# Patient Record
Sex: FEMALE | Race: WHITE | NOT HISPANIC OR LATINO | Employment: FULL TIME | URBAN - METROPOLITAN AREA
[De-identification: names, ages, dates, MRNs, and addresses within clinical notes are randomized per-mention and may not be internally consistent; named-entity substitution may affect disease eponyms.]

---

## 2024-09-27 ENCOUNTER — HOSPITAL ENCOUNTER (INPATIENT)
Facility: HOSPITAL | Age: 44
LOS: 3 days | Discharge: HOME | End: 2024-09-30
Attending: EMERGENCY MEDICINE | Admitting: FAMILY MEDICINE
Payer: COMMERCIAL

## 2024-09-27 ENCOUNTER — APPOINTMENT (OUTPATIENT)
Dept: GENERAL RADIOLOGY | Facility: HOSPITAL | Age: 44
End: 2024-09-27
Payer: COMMERCIAL

## 2024-09-27 DIAGNOSIS — D50.9 IRON DEFICIENCY ANEMIA, UNSPECIFIED IRON DEFICIENCY ANEMIA TYPE: ICD-10-CM

## 2024-09-27 DIAGNOSIS — L02.414 ABSCESS OF FOREARM, LEFT: Primary | ICD-10-CM

## 2024-09-27 PROBLEM — L03.90 CELLULITIS: Status: ACTIVE | Noted: 2024-09-27

## 2024-09-27 LAB
ALBUMIN SERPL-MCNC: 3.5 G/DL (ref 3.5–5.2)
ALBUMIN/GLOB SERPL: 0.9 G/DL
ALP SERPL-CCNC: 88 U/L (ref 39–117)
ALT SERPL W P-5'-P-CCNC: 20 U/L (ref 1–33)
ANION GAP SERPL CALCULATED.3IONS-SCNC: 9.8 MMOL/L (ref 5–15)
ANISOCYTOSIS BLD QL: NORMAL
AST SERPL-CCNC: 17 U/L (ref 1–32)
BASOPHILS # BLD AUTO: 0.01 10*3/MM3 (ref 0–0.2)
BASOPHILS NFR BLD AUTO: 0.1 % (ref 0–1.5)
BILIRUB SERPL-MCNC: 0.7 MG/DL (ref 0–1.2)
BUN SERPL-MCNC: 6 MG/DL (ref 6–20)
BUN/CREAT SERPL: 10 (ref 7–25)
CALCIUM SPEC-SCNC: 9.4 MG/DL (ref 8.6–10.5)
CHLORIDE SERPL-SCNC: 98 MMOL/L (ref 98–107)
CO2 SERPL-SCNC: 28.2 MMOL/L (ref 22–29)
CREAT SERPL-MCNC: 0.6 MG/DL (ref 0.57–1)
D-LACTATE SERPL-SCNC: 1.4 MMOL/L (ref 0.5–2)
DEPRECATED RDW RBC AUTO: 42.7 FL (ref 37–54)
EGFRCR SERPLBLD CKD-EPI 2021: 114.4 ML/MIN/1.73
EOSINOPHIL # BLD AUTO: 0.25 10*3/MM3 (ref 0–0.4)
EOSINOPHIL NFR BLD AUTO: 2.5 % (ref 0.3–6.2)
ERYTHROCYTE [DISTWIDTH] IN BLOOD BY AUTOMATED COUNT: 15.8 % (ref 12.3–15.4)
GLOBULIN UR ELPH-MCNC: 3.8 GM/DL
GLUCOSE SERPL-MCNC: 123 MG/DL (ref 65–99)
HCT VFR BLD AUTO: 35.5 % (ref 34–46.6)
HGB BLD-MCNC: 10.9 G/DL (ref 12–15.9)
HYPOCHROMIA BLD QL: NORMAL
IMM GRANULOCYTES # BLD AUTO: 0.04 10*3/MM3 (ref 0–0.05)
IMM GRANULOCYTES NFR BLD AUTO: 0.4 % (ref 0–0.5)
LYMPHOCYTES # BLD AUTO: 1.81 10*3/MM3 (ref 0.7–3.1)
LYMPHOCYTES NFR BLD AUTO: 18.3 % (ref 19.6–45.3)
MACROCYTES BLD QL SMEAR: NORMAL
MCH RBC QN AUTO: 22.9 PG (ref 26.6–33)
MCHC RBC AUTO-ENTMCNC: 30.7 G/DL (ref 31.5–35.7)
MCV RBC AUTO: 74.7 FL (ref 79–97)
MICROCYTES BLD QL: NORMAL
MONOCYTES # BLD AUTO: 0.38 10*3/MM3 (ref 0.1–0.9)
MONOCYTES NFR BLD AUTO: 3.8 % (ref 5–12)
NEUTROPHILS NFR BLD AUTO: 7.41 10*3/MM3 (ref 1.7–7)
NEUTROPHILS NFR BLD AUTO: 74.9 % (ref 42.7–76)
NRBC BLD AUTO-RTO: 0 /100 WBC (ref 0–0.2)
PLATELET # BLD AUTO: 290 10*3/MM3 (ref 140–450)
PMV BLD AUTO: 9.7 FL (ref 6–12)
POTASSIUM SERPL-SCNC: 3.5 MMOL/L (ref 3.5–5.2)
PROT SERPL-MCNC: 7.3 G/DL (ref 6–8.5)
RBC # BLD AUTO: 4.75 10*6/MM3 (ref 3.77–5.28)
SMALL PLATELETS BLD QL SMEAR: ADEQUATE
SODIUM SERPL-SCNC: 136 MMOL/L (ref 136–145)
WBC MORPH BLD: NORMAL
WBC NRBC COR # BLD AUTO: 9.9 10*3/MM3 (ref 3.4–10.8)

## 2024-09-27 PROCEDURE — 36415 COLL VENOUS BLD VENIPUNCTURE: CPT

## 2024-09-27 PROCEDURE — 85025 COMPLETE CBC W/AUTO DIFF WBC: CPT | Performed by: EMERGENCY MEDICINE

## 2024-09-27 PROCEDURE — 96365 THER/PROPH/DIAG IV INF INIT: CPT

## 2024-09-27 PROCEDURE — 84466 ASSAY OF TRANSFERRIN: CPT | Performed by: FAMILY MEDICINE

## 2024-09-27 PROCEDURE — 25010000002 PIPERACILLIN SOD-TAZOBACTAM PER 1 G: Performed by: FAMILY MEDICINE

## 2024-09-27 PROCEDURE — 25010000002 VANCOMYCIN 1 G RECONSTITUTED SOLUTION: Performed by: FAMILY MEDICINE

## 2024-09-27 PROCEDURE — 25810000003 SODIUM CHLORIDE 0.9 % SOLUTION: Performed by: FAMILY MEDICINE

## 2024-09-27 PROCEDURE — 87040 BLOOD CULTURE FOR BACTERIA: CPT | Performed by: EMERGENCY MEDICINE

## 2024-09-27 PROCEDURE — 99285 EMERGENCY DEPT VISIT HI MDM: CPT

## 2024-09-27 PROCEDURE — 83540 ASSAY OF IRON: CPT | Performed by: FAMILY MEDICINE

## 2024-09-27 PROCEDURE — 99223 1ST HOSP IP/OBS HIGH 75: CPT | Performed by: FAMILY MEDICINE

## 2024-09-27 PROCEDURE — 96361 HYDRATE IV INFUSION ADD-ON: CPT

## 2024-09-27 PROCEDURE — 25010000002 VANCOMYCIN 5 G RECONSTITUTED SOLUTION: Performed by: EMERGENCY MEDICINE

## 2024-09-27 PROCEDURE — 73090 X-RAY EXAM OF FOREARM: CPT

## 2024-09-27 PROCEDURE — 80053 COMPREHEN METABOLIC PANEL: CPT | Performed by: EMERGENCY MEDICINE

## 2024-09-27 PROCEDURE — 0H9EXZZ DRAINAGE OF LEFT LOWER ARM SKIN, EXTERNAL APPROACH: ICD-10-PCS | Performed by: EMERGENCY MEDICINE

## 2024-09-27 PROCEDURE — 25810000003 SODIUM CHLORIDE 0.9 % SOLUTION: Performed by: EMERGENCY MEDICINE

## 2024-09-27 PROCEDURE — 85007 BL SMEAR W/DIFF WBC COUNT: CPT | Performed by: EMERGENCY MEDICINE

## 2024-09-27 PROCEDURE — 83605 ASSAY OF LACTIC ACID: CPT | Performed by: EMERGENCY MEDICINE

## 2024-09-27 PROCEDURE — 25010000002 PIPERACILLIN SOD-TAZOBACTAM PER 1 G: Performed by: EMERGENCY MEDICINE

## 2024-09-27 PROCEDURE — 25810000003 LACTATED RINGERS PER 1000 ML: Performed by: FAMILY MEDICINE

## 2024-09-27 RX ORDER — ENOXAPARIN SODIUM 100 MG/ML
40 INJECTION SUBCUTANEOUS DAILY
Status: DISCONTINUED | OUTPATIENT
Start: 2024-09-27 | End: 2024-09-30 | Stop reason: HOSPADM

## 2024-09-27 RX ORDER — VANCOMYCIN 2 GRAM/500 ML IN 0.9 % SODIUM CHLORIDE INTRAVENOUS
20 ONCE
Status: COMPLETED | OUTPATIENT
Start: 2024-09-27 | End: 2024-09-27

## 2024-09-27 RX ORDER — AMOXICILLIN 250 MG
2 CAPSULE ORAL 2 TIMES DAILY PRN
Status: DISCONTINUED | OUTPATIENT
Start: 2024-09-27 | End: 2024-09-30 | Stop reason: HOSPADM

## 2024-09-27 RX ORDER — SODIUM CHLORIDE 0.9 % (FLUSH) 0.9 %
10 SYRINGE (ML) INJECTION EVERY 12 HOURS SCHEDULED
Status: DISCONTINUED | OUTPATIENT
Start: 2024-09-27 | End: 2024-09-30 | Stop reason: HOSPADM

## 2024-09-27 RX ORDER — BISACODYL 5 MG/1
5 TABLET, DELAYED RELEASE ORAL DAILY PRN
Status: DISCONTINUED | OUTPATIENT
Start: 2024-09-27 | End: 2024-09-30 | Stop reason: HOSPADM

## 2024-09-27 RX ORDER — POLYETHYLENE GLYCOL 3350 17 G/17G
17 POWDER, FOR SOLUTION ORAL DAILY PRN
Status: DISCONTINUED | OUTPATIENT
Start: 2024-09-27 | End: 2024-09-30 | Stop reason: HOSPADM

## 2024-09-27 RX ORDER — BISACODYL 10 MG
10 SUPPOSITORY, RECTAL RECTAL DAILY PRN
Status: DISCONTINUED | OUTPATIENT
Start: 2024-09-27 | End: 2024-09-30 | Stop reason: HOSPADM

## 2024-09-27 RX ORDER — ONDANSETRON 2 MG/ML
4 INJECTION INTRAMUSCULAR; INTRAVENOUS EVERY 6 HOURS PRN
Status: DISCONTINUED | OUTPATIENT
Start: 2024-09-27 | End: 2024-09-30 | Stop reason: HOSPADM

## 2024-09-27 RX ORDER — SODIUM CHLORIDE 0.9 % (FLUSH) 0.9 %
10 SYRINGE (ML) INJECTION AS NEEDED
Status: DISCONTINUED | OUTPATIENT
Start: 2024-09-27 | End: 2024-09-30 | Stop reason: HOSPADM

## 2024-09-27 RX ORDER — SODIUM CHLORIDE 9 MG/ML
40 INJECTION, SOLUTION INTRAVENOUS AS NEEDED
Status: DISCONTINUED | OUTPATIENT
Start: 2024-09-27 | End: 2024-09-30 | Stop reason: HOSPADM

## 2024-09-27 RX ORDER — LIDOCAINE HYDROCHLORIDE AND EPINEPHRINE 10; 10 MG/ML; UG/ML
5 INJECTION, SOLUTION INFILTRATION; PERINEURAL ONCE
Status: COMPLETED | OUTPATIENT
Start: 2024-09-27 | End: 2024-09-27

## 2024-09-27 RX ORDER — SACCHAROMYCES BOULARDII 250 MG
250 CAPSULE ORAL 2 TIMES DAILY
Status: DISCONTINUED | OUTPATIENT
Start: 2024-09-27 | End: 2024-09-30 | Stop reason: HOSPADM

## 2024-09-27 RX ORDER — KETOROLAC TROMETHAMINE 15 MG/ML
15 INJECTION, SOLUTION INTRAMUSCULAR; INTRAVENOUS EVERY 6 HOURS PRN
Status: DISCONTINUED | OUTPATIENT
Start: 2024-09-27 | End: 2024-09-30 | Stop reason: HOSPADM

## 2024-09-27 RX ORDER — SODIUM CHLORIDE, SODIUM LACTATE, POTASSIUM CHLORIDE, CALCIUM CHLORIDE 600; 310; 30; 20 MG/100ML; MG/100ML; MG/100ML; MG/100ML
100 INJECTION, SOLUTION INTRAVENOUS CONTINUOUS
Status: ACTIVE | OUTPATIENT
Start: 2024-09-27 | End: 2024-09-28

## 2024-09-27 RX ADMIN — Medication 250 MG: at 21:09

## 2024-09-27 RX ADMIN — VANCOMYCIN HYDROCHLORIDE 1000 MG: 1 INJECTION, POWDER, LYOPHILIZED, FOR SOLUTION INTRAVENOUS at 23:44

## 2024-09-27 RX ADMIN — PIPERACILLIN AND TAZOBACTAM 3.38 G: 3; .375 INJECTION, POWDER, FOR SOLUTION INTRAVENOUS at 21:09

## 2024-09-27 RX ADMIN — SODIUM CHLORIDE, POTASSIUM CHLORIDE, SODIUM LACTATE AND CALCIUM CHLORIDE 100 ML/HR: 600; 310; 30; 20 INJECTION, SOLUTION INTRAVENOUS at 18:32

## 2024-09-27 RX ADMIN — Medication 10 ML: at 21:10

## 2024-09-27 RX ADMIN — PIPERACILLIN AND TAZOBACTAM 3.38 G: 3; .375 INJECTION, POWDER, FOR SOLUTION INTRAVENOUS at 14:41

## 2024-09-27 RX ADMIN — VANCOMYCIN HYDROCHLORIDE 2000 MG: 5 INJECTION, POWDER, LYOPHILIZED, FOR SOLUTION INTRAVENOUS at 15:18

## 2024-09-27 RX ADMIN — LIDOCAINE HYDROCHLORIDE,EPINEPHRINE BITARTRATE 5 ML: 10; .01 INJECTION, SOLUTION INFILTRATION; PERINEURAL at 14:19

## 2024-09-27 NOTE — ED PROVIDER NOTES
"Time: 1:01 PM EDT  Date of encounter:  9/27/2024  Independent Historian/Clinical History and Information was obtained by:   Patient    History is limited by: N/A    Chief Complaint: Abscess      History of Present Illness:  Patient is a 44 y.o. year old female who presents to the emergency department for evaluation of an abscess on the right arm. The abscess has been present since Wednesday, and is reported to have originated from a missed heroin injection. Patient reports it is extremely painful and as well as an associated fever.  She reports it has been draining since today.. She has no known past medical history, drug allergies, and is not currently taking and prescribed medications.       Patient Care Team  Primary Care Provider: Provider, No Known    Past Medical History:     No Known Allergies  History reviewed. No pertinent past medical history.  Past Surgical History:   Procedure Laterality Date    CHOLECYSTECTOMY      TUBAL ABDOMINAL LIGATION       History reviewed. No pertinent family history.    Home Medications:  Prior to Admission medications    Not on File        Social History:   Social History     Tobacco Use    Smoking status: Never    Smokeless tobacco: Never   Substance Use Topics    Alcohol use: Never    Drug use: Not Currently         Review of Systems:  Review of Systems   Skin:  Positive for wound.        Physical Exam:  /54 (BP Location: Right arm, Patient Position: Sitting)   Pulse 57   Temp 97.9 °F (36.6 °C) (Oral)   Resp 18   Ht 160 cm (63\")   Wt 98.5 kg (217 lb 2.5 oz)   LMP 09/20/2024   SpO2 100%   BMI 38.47 kg/m²     Physical Exam  Vitals and nursing note reviewed.   Constitutional:       Appearance: Normal appearance.   HENT:      Head: Normocephalic and atraumatic.   Eyes:      General: No scleral icterus.  Cardiovascular:      Rate and Rhythm: Normal rate and regular rhythm.   Pulmonary:      Effort: Pulmonary effort is normal.   Musculoskeletal:      Cervical back: " Normal range of motion.   Skin:     Findings: No rash.   Neurological:      General: No focal deficit present.      Mental Status: She is alert.                        Procedures:  Incision & Drainage    Date/Time: 9/27/2024 2:36 PM    Performed by: Gilberto Grimes MD  Authorized by: Gilberto Grimes MD    Consent:     Consent obtained:  Verbal    Consent given by:  Patient    Risks discussed:  Bleeding, incomplete drainage and pain  Location:     Location:  Upper extremity    Upper extremity location:  Arm    Arm location:  L lower arm  Anesthesia:     Anesthesia method:  Local infiltration    Local anesthetic:  Lidocaine 1% WITH epi  Procedure type:     Complexity:  Complex  Procedure details:     Incision types:  Elliptical    Incision depth:  Dermal    Wound management:  Probed and deloculated and irrigated with saline    Drainage:  Purulent and bloody    Drainage amount:  Copious    Wound treatment:  Wound left open    Packing materials:  None  Post-procedure details:     Procedure completion:  Tolerated        Medical Decision Making:      Comorbidities that affect care:    Substance Abuse    External Notes reviewed:    No previous records noted      The following orders were placed and all results were independently analyzed by me:  Orders Placed This Encounter   Procedures    Incision & Drainage    Blood Culture - Blood,    Blood Culture - Blood,    XR Forearm 2 View Left    Comprehensive Metabolic Panel    Lactic Acid, Plasma    CBC Auto Differential    Scan Slide    IP Consult to General Surgery    Inpatient Hospitalist Consult    CBC & Differential       Medications Given in the Emergency Department:  Medications   vancomycin IVPB 2000 mg in 0.9% Sodium Chloride 500 mL (2,000 mg Intravenous New Bag 9/27/24 1518)   lidocaine 1% - EPINEPHrine 1:832525 (XYLOCAINE W/EPI) 1 %-1:346286 injection 5 mL (5 mL Injection Given 9/27/24 1419)   piperacillin-tazobactam (ZOSYN) IVPB 3.375 g IVPB in 100 mL NS  (VTB) (0 g Intravenous Stopped 9/27/24 1518)        ED Course:    ED Course as of 09/27/24 1602   Fri Sep 27, 2024   1430 Spoke with Dr. Riddle who will consult.  Placed on IV antibiotics. [MA]   1602 Spoke with Dr. Ragland who agrees to admit.  [MA]      ED Course User Index  [MA] Gilberto Grimes MD       Labs:    Lab Results (last 24 hours)       Procedure Component Value Units Date/Time    CBC & Differential [[id number]]  (Abnormal) Collected: 09/27/24 1441    Specimen: Blood Updated: 09/27/24 1526    Narrative:      The following orders were created for panel order CBC & Differential.  Procedure                               Abnormality         Status                     ---------                               -----------         ------                     CBC Auto Differential[[id number]]        Abnormal            Final result               Scan Slide[[id number]]                                       Final result                 Please view results for these tests on the individual orders.    Comprehensive Metabolic Panel [[id number]]  (Abnormal) Collected: 09/27/24 1441    Specimen: Blood Updated: 09/27/24 1506     Glucose 123 mg/dL      BUN 6 mg/dL      Creatinine 0.60 mg/dL      Sodium 136 mmol/L      Potassium 3.5 mmol/L      Chloride 98 mmol/L      CO2 28.2 mmol/L      Calcium 9.4 mg/dL      Total Protein 7.3 g/dL      Albumin 3.5 g/dL      ALT (SGPT) 20 U/L      AST (SGOT) 17 U/L      Alkaline Phosphatase 88 U/L      Total Bilirubin 0.7 mg/dL      Globulin 3.8 gm/dL      A/G Ratio 0.9 g/dL      BUN/Creatinine Ratio 10.0     Anion Gap 9.8 mmol/L      eGFR 114.4 mL/min/1.73     Narrative:      GFR Normal >60  Chronic Kidney Disease <60  Kidney Failure <15      Blood Culture - Blood, Arm, Right [[id number]] Collected: 09/27/24 1441    Specimen: Blood from Arm, Right Updated: 09/27/24 1446    Blood Culture - Blood, Arm, Left [[id number]] Collected: 09/27/24 1441    Specimen: Blood from Arm, Left Updated:  09/27/24 1446    Lactic Acid, Plasma [[id number]]  (Normal) Collected: 09/27/24 1441    Specimen: Blood Updated: 09/27/24 1504     Lactate 1.4 mmol/L     CBC Auto Differential [[id number]]  (Abnormal) Collected: 09/27/24 1441    Specimen: Blood Updated: 09/27/24 1526     WBC 9.90 10*3/mm3      RBC 4.75 10*6/mm3      Hemoglobin 10.9 g/dL      Hematocrit 35.5 %      MCV 74.7 fL      MCH 22.9 pg      MCHC 30.7 g/dL      RDW 15.8 %      RDW-SD 42.7 fl      MPV 9.7 fL      Platelets 290 10*3/mm3      Neutrophil % 74.9 %      Lymphocyte % 18.3 %      Monocyte % 3.8 %      Eosinophil % 2.5 %      Basophil % 0.1 %      Immature Grans % 0.4 %      Neutrophils, Absolute 7.41 10*3/mm3      Lymphocytes, Absolute 1.81 10*3/mm3      Monocytes, Absolute 0.38 10*3/mm3      Eosinophils, Absolute 0.25 10*3/mm3      Basophils, Absolute 0.01 10*3/mm3      Immature Grans, Absolute 0.04 10*3/mm3      nRBC 0.0 /100 WBC     Narrative:      Appended report. These results have been appended to a previously verified report.    Scan Slide [[id number]] Collected: 09/27/24 1441    Specimen: Blood Updated: 09/27/24 1526     Anisocytosis Slight/1+     Hypochromia Slight/1+     Microcytes Slight/1+     Macrocytes Slight/1+     WBC Morphology Normal     Platelet Estimate Adequate             Imaging:    XR Forearm 2 View Left    Result Date: 9/27/2024  XR FOREARM 2 VW LEFT Date of Exam: 9/27/2024 1:18 PM EDT Indication: wound Comparison: None available. Findings: There is some soft tissue changes along the radial aspect of the proximal forearm that could relate to reported abscess. No discrete abnormality of the radius or ulna is apparent. Elbow area grossly unremarkable.     Impression: 1.Soft tissue changes along the proximal forearm more laterally and dorsally that could relate to reported abscess. Electronically Signed: Ap Garza MD  9/27/2024 1:49 PM EDT  Workstation ID: [id number]       Differential Diagnosis and Discussion:    Abscess:  Differential diagnosis for an abscess includes but is not limited to bacterial or fungal infections, foreign body reactions, malignancies, and autoimmune or inflammatory conditions.    All labs were reviewed and interpreted by me.  All X-rays impressions were independently interpreted by me.    MDM       Patient is a 44-year-old female who presents with complaints of an abscess.  Patient is an IV drug user.  Had a large necrotic center with purulent drainage.  I did I&D on the patient.  Will place on antibiotics.  Due to the size and status post I&D I did recommend admission to the hospital.  Did consult general surgery who will evaluate.  Will admit to the hospital further workup management.              Patient Care Considerations:          Consultants/Shared Management Plan:    Hospitalist: I have discussed the case with Dr. Ragland who agrees to accept the patient for admission.  Consultant: I have discussed the case with Dr. Riddle who agrees to consult on the patient.    Social Determinants of Health:          Disposition and Care Coordination:    Admit:   Through independent evaluation of the patient's history, physical, and imperical data, the patient meets criteria for inpatient admission to the hospital.        Final diagnoses:   Abscess of forearm, left        ED Disposition       ED Disposition   Decision to Admit    Condition   --    Comment   --               This medical record created using voice recognition software.             Gilberto Grimes MD  09/27/24 0440

## 2024-09-27 NOTE — ED NOTES
Patient told RN that she last used IV substance 3 days ago before going into rehab. Patient has visible markings all over arms. Patient states last use was on left arm where abscess has formed. Patient states others that used with her also had spots forming. Patient used a needle from someone who told her it was clean.

## 2024-09-27 NOTE — PROGRESS NOTES
"[facility] Clinical Pharmacy Services: Vancomycin Pharmacokinetic Initial Consult Note    Farhana Vazquez is a 44 y.o. female who is on day 1 of pharmacy to dose vancomycin for Skin and Soft Tissue.    Consult Information  Consulting Provider: Ellyn  Planned Duration of Therapy: 7 days   Was Patient Receiving Prior to Admission/Consult?: No  Loading Dose Given: 2000 mg on  at 1518  PK/PD Target: -600 mg/L.hr   Other Antimicrobials: Piperacillin/Tazobactam    Imaging Reviewed?: Yes    Microbiology Data  MRSA PCR performed: No; Result: Not ordered due to excluded indication or presence of suspected abscess  Culture/Source:   -Blood culture(s): In process     Vitals/Labs  Ht: 160 cm (63\"); Wt: 98.5 kg (217 lb 2.5 oz)  Temp (24hrs), Av.9 °F (36.6 °C), Min:97.9 °F (36.6 °C), Max:97.9 °F (36.6 °C)   Estimated Creatinine Clearance: 135.1 mL/min (by C-G formula based on SCr of 0.6 mg/dL).       Results from last 7 days   Lab Units 24  1441   CREATININE mg/dL 0.60   WBC 10*3/mm3 9.90     Assessment/Plan:    Vancomycin Dose:  1000 mg IV every 8 hours; which provides the following predicted parameters:  Exposure target: AUC24 (range)400-600 mg/L.hr   AUC24,ss: 552 mg/L.hr  Probability of AUC24 > 400: 80 %  Ctrough,ss: 18.5 mg/L  Probability of Ctrough,ss > 20: 44 %  Probability of nephrotoxicity (Lodise GILBERT ): 15 %  Vanc Trough ordered for  at 0600  Patient has order for Basic Metabolic Panel    Pharmacy will follow patient's kidney function and will adjust doses and obtain levels as necessary. Thank you for involving pharmacy in this patient's care. Please contact pharmacy with any questions or concerns.                           Floyd Schmidt, [facility]  Clinical Pharmacist   "

## 2024-09-28 PROBLEM — F19.90 IV DRUG USER: Status: ACTIVE | Noted: 2024-09-28

## 2024-09-28 PROBLEM — L02.414 ABSCESS OF FOREARM, LEFT: Status: ACTIVE | Noted: 2024-09-28

## 2024-09-28 LAB
FERRITIN SERPL-MCNC: 45.49 NG/ML (ref 13–150)
HAV IGM SERPL QL IA: ABNORMAL
HBV CORE IGM SERPL QL IA: ABNORMAL
HBV SURFACE AG SERPL QL IA: ABNORMAL
HCV AB SER QL: REACTIVE
HIV 1+2 AB+HIV1P24 AG SERPLBLD IA.RAPID: NORMAL
HIV 1+2 AB+HIV1P24 AG SERPLBLD IA.RAPID: NORMAL
HOLD SPECIMEN: NORMAL
IRON 24H UR-MRATE: 19 MCG/DL (ref 37–145)
IRON SATN MFR SERPL: 5 % (ref 20–50)
TIBC SERPL-MCNC: 374 MCG/DL (ref 298–536)
TRANSFERRIN SERPL-MCNC: 251 MG/DL (ref 200–360)
VANCOMYCIN TROUGH SERPL-MCNC: 15.29 MCG/ML (ref 5–20)

## 2024-09-28 PROCEDURE — 99221 1ST HOSP IP/OBS SF/LOW 40: CPT | Performed by: SURGERY

## 2024-09-28 PROCEDURE — 25810000003 LACTATED RINGERS PER 1000 ML: Performed by: FAMILY MEDICINE

## 2024-09-28 PROCEDURE — 25010000002 NA FERRIC GLUC CPLX PER 12.5 MG: Performed by: STUDENT IN AN ORGANIZED HEALTH CARE EDUCATION/TRAINING PROGRAM

## 2024-09-28 PROCEDURE — 25810000003 SODIUM CHLORIDE 0.9 % SOLUTION: Performed by: FAMILY MEDICINE

## 2024-09-28 PROCEDURE — G0475 HIV COMBINATION ASSAY: HCPCS | Performed by: STUDENT IN AN ORGANIZED HEALTH CARE EDUCATION/TRAINING PROGRAM

## 2024-09-28 PROCEDURE — 82728 ASSAY OF FERRITIN: CPT | Performed by: STUDENT IN AN ORGANIZED HEALTH CARE EDUCATION/TRAINING PROGRAM

## 2024-09-28 PROCEDURE — 80074 ACUTE HEPATITIS PANEL: CPT | Performed by: STUDENT IN AN ORGANIZED HEALTH CARE EDUCATION/TRAINING PROGRAM

## 2024-09-28 PROCEDURE — 96375 TX/PRO/DX INJ NEW DRUG ADDON: CPT

## 2024-09-28 PROCEDURE — 96361 HYDRATE IV INFUSION ADD-ON: CPT

## 2024-09-28 PROCEDURE — 25010000002 VANCOMYCIN 5 G RECONSTITUTED SOLUTION: Performed by: FAMILY MEDICINE

## 2024-09-28 PROCEDURE — 25010000002 LORAZEPAM PER 2 MG: Performed by: FAMILY MEDICINE

## 2024-09-28 PROCEDURE — 25810000003 SODIUM CHLORIDE 0.9 % SOLUTION: Performed by: STUDENT IN AN ORGANIZED HEALTH CARE EDUCATION/TRAINING PROGRAM

## 2024-09-28 PROCEDURE — 97602 WOUND(S) CARE NON-SELECTIVE: CPT

## 2024-09-28 PROCEDURE — 80202 ASSAY OF VANCOMYCIN: CPT | Performed by: FAMILY MEDICINE

## 2024-09-28 PROCEDURE — 99232 SBSQ HOSP IP/OBS MODERATE 35: CPT | Performed by: STUDENT IN AN ORGANIZED HEALTH CARE EDUCATION/TRAINING PROGRAM

## 2024-09-28 PROCEDURE — 25010000002 PIPERACILLIN SOD-TAZOBACTAM PER 1 G: Performed by: FAMILY MEDICINE

## 2024-09-28 RX ORDER — VANCOMYCIN/0.9 % SOD CHLORIDE 1.5G/250ML
1500 PLASTIC BAG, INJECTION (ML) INTRAVENOUS EVERY 12 HOURS
Status: DISCONTINUED | OUTPATIENT
Start: 2024-09-28 | End: 2024-09-30

## 2024-09-28 RX ORDER — LORAZEPAM 2 MG/ML
1 INJECTION INTRAMUSCULAR EVERY 4 HOURS PRN
Status: DISCONTINUED | OUTPATIENT
Start: 2024-09-28 | End: 2024-09-30 | Stop reason: HOSPADM

## 2024-09-28 RX ORDER — LOPERAMIDE HCL 2 MG
2 CAPSULE ORAL 4 TIMES DAILY PRN
Status: DISCONTINUED | OUTPATIENT
Start: 2024-09-28 | End: 2024-09-30 | Stop reason: HOSPADM

## 2024-09-28 RX ADMIN — LORAZEPAM 1 MG: 2 INJECTION INTRAMUSCULAR; INTRAVENOUS at 20:36

## 2024-09-28 RX ADMIN — SODIUM CHLORIDE, POTASSIUM CHLORIDE, SODIUM LACTATE AND CALCIUM CHLORIDE 100 ML/HR: 600; 310; 30; 20 INJECTION, SOLUTION INTRAVENOUS at 04:30

## 2024-09-28 RX ADMIN — VANCOMYCIN HYDROCHLORIDE 1500 MG: 5 INJECTION, POWDER, LYOPHILIZED, FOR SOLUTION INTRAVENOUS at 22:34

## 2024-09-28 RX ADMIN — Medication 250 MG: at 09:23

## 2024-09-28 RX ADMIN — PIPERACILLIN AND TAZOBACTAM 3.38 G: 3; .375 INJECTION, POWDER, FOR SOLUTION INTRAVENOUS at 20:27

## 2024-09-28 RX ADMIN — SODIUM CHLORIDE 250 MG: 9 INJECTION, SOLUTION INTRAVENOUS at 16:27

## 2024-09-28 RX ADMIN — Medication 10 ML: at 09:23

## 2024-09-28 RX ADMIN — VANCOMYCIN HYDROCHLORIDE 1500 MG: 5 INJECTION, POWDER, LYOPHILIZED, FOR SOLUTION INTRAVENOUS at 09:22

## 2024-09-28 RX ADMIN — PIPERACILLIN AND TAZOBACTAM 3.38 G: 3; .375 INJECTION, POWDER, FOR SOLUTION INTRAVENOUS at 04:20

## 2024-09-28 RX ADMIN — Medication 250 MG: at 20:27

## 2024-09-28 RX ADMIN — PIPERACILLIN AND TAZOBACTAM 3.38 G: 3; .375 INJECTION, POWDER, FOR SOLUTION INTRAVENOUS at 12:35

## 2024-09-28 RX ADMIN — Medication 10 ML: at 20:27

## 2024-09-28 NOTE — PLAN OF CARE
Goal Outcome Evaluation:  Plan of Care Reviewed With: patient   Pt has rested very well this shift with no complaints of pain or discomfort. Remains on room air. Alert and oriented x4. Spoke with Dr. Ragland about getting a nicotine patch, ppd smoker. Up ad eduardo.

## 2024-09-28 NOTE — PROGRESS NOTES
[facility]   Hospitalist Progress Note  Date: 2024  Patient Name: Farhana Vazquez  : 1980  MRN: [id number]  Date of admission: 2024  Room/Bed: [facility]      Subjective   Subjective     Chief Complaint: Cellulitis    Summary:Farhana Vazquez is a 44 y.o. female  with past medical history of IV drug use, anxiety, and GERD presenting to the ED for evaluation of a left arm abscess with surrounding cellulitis.  Is a IV drug user and likely missed a vein a few days ago on her left forearm.  2 days ago patient noticed some redness and swelling that quickly got worse with surrounding cellulitis and palpable abscess.  He also had some fevers and chills.  Due to concern she came to the ED for further evaluation.  In the ED patient's vitals were all within normal limits on arrival.  Labs showed mild anemia with remaining being relatively unremarkable including a normal lactic acid level.  X-ray of the forearm shows soft tissue changes on the proximal forearm laterally and dorsally.  In the ED patient had abscess drained.  When seen patient resting comfortably although in some pain.  She denied any headaches, focal weakness, chest pain, palpitation, shortness of breath, cough, abdominal pain, nausea, vomiting, diarrhea, constipation, dysuria, hematuria, hematochezia, melena, or anxiety.  Patient admitted for further evaluation and treatment.     Interval Followup: No acute overnight events.  No acute distress.    Review of Systems    All systems reviewed and negative except for what is outlined above.      Objective   Objective     Vitals:   Temp:  [97.9 °F (36.6 °C)-99.5 °F (37.5 °C)] 98.6 °F (37 °C)  Heart Rate:  [57-65] 62  Resp:  [18-20] 18  BP: (120-151)/(52-66) 125/66    Physical Exam   Gen: NAD, Alert and Oriented  Cards: RRR, no murmur   Pulm: CTA b/l, no wheezing  Abd: soft, nondistended  Extremities: no pitting edema    Result Review    Result Review:  I have personally reviewed these results:  [x]   Laboratory      Lab 09/27/24  1441   WBC 9.90   HEMOGLOBIN 10.9*   HEMATOCRIT 35.5   PLATELETS 290   NEUTROS ABS 7.41*   IMMATURE GRANS (ABS) 0.04   LYMPHS ABS 1.81   MONOS ABS 0.38   EOS ABS 0.25   MCV 74.7*   LACTATE 1.4         Lab 09/27/24  1441   SODIUM 136   POTASSIUM 3.5   CHLORIDE 98   CO2 28.2   ANION GAP 9.8   BUN 6   CREATININE 0.60   EGFR 114.4   GLUCOSE 123*   CALCIUM 9.4         Lab 09/27/24  1441   TOTAL PROTEIN 7.3   ALBUMIN 3.5   GLOBULIN 3.8   ALT (SGPT) 20   AST (SGOT) 17   BILIRUBIN 0.7   ALK PHOS 88                 Lab 09/27/24  1441   IRON 19*   IRON SATURATION (TSAT) 5*   TIBC 374   TRANSFERRIN 251         Brief Urine Lab Results       None          [x]  Microbiology   Microbiology Results (last 10 days)       ** No results found for the last 240 hours. **          [x]  Radiology  XR Forearm 2 View Left    Result Date: 9/27/2024  Impression: 1.Soft tissue changes along the proximal forearm more laterally and dorsally that could relate to reported abscess. Electronically Signed: Ap Garza MD  9/27/2024 1:49 PM EDT  Workstation ID: [id number]   []  EKG/Telemetry   []  Cardiology/Vascular   []  Pathology  []  Old records  []  Other:    Assessment & Plan   Assessment / Plan     Assessment:  Left arm cellulitis with abscess  S/p I&D  IV drug abuse  Iron deficiency  Microcytic anemia  Heart murmur    Plan:  Continue inpatient admission and care  Continue IV vancomycin and Zosyn  S/p incision and drainage in ED.  Unfortunately, no cultures were sent.  Blood cultures pending  Murmur noted on physical exam.  Pending blood culture results will get echo.  Microcytic anemia with hemoglobin 10.9.  Iron 19/sat 5/ferritin 45.  IV iron x 3 ordered.  Discussed with the patient that she needs to have a colonoscopy.  Screen for hepatitis and HIV  General Surgery consulted.  A.m. labs       Discussed with RN.    VTE Prophylaxis:  Pharmacologic VTE prophylaxis orders are present.        CODE STATUS:   Level Of  Support Discussed With: Patient  Code Status (Patient has no pulse and is not breathing): CPR (Attempt to Resuscitate)  Medical Interventions (Patient has pulse or is breathing): Full Support      Electronically signed by Viviane Mack DO, 9/28/2024, 09:57 EDT.

## 2024-09-28 NOTE — PLAN OF CARE
Goal Outcome Evaluation:  Plan of Care Reviewed With: patient        Progress: no change  Outcome Evaluation: Patient alert and oriented x4 throughout shift. Wound care completed per orders. IV abx given per MAR. No complaints of pain this shift. No new issues at this time.

## 2024-09-28 NOTE — CONSULTS
Chief Complaint: Abscess (abscess on L arm)    Subjective         Abscess      Farhana Vazquez is a 44 y.o. female presents to [address] to be seen for an arm abscess.  This was drained in the ER and I was asked to follow along in case infection were to worsen.     Objective     History reviewed. No pertinent past medical history.    Past Surgical History:   Procedure Laterality Date    CHOLECYSTECTOMY      TUBAL ABDOMINAL LIGATION           Current Facility-Administered Medications:     sennosides-docusate (PERICOLACE) 8.6-50 MG per tablet 2 tablet, 2 tablet, Oral, BID PRN **AND** polyethylene glycol (MIRALAX) packet 17 g, 17 g, Oral, Daily PRN **AND** bisacodyl (DULCOLAX) EC tablet 5 mg, 5 mg, Oral, Daily PRN **AND** bisacodyl (DULCOLAX) suppository 10 mg, 10 mg, Rectal, Daily PRN, Manas Ragland MD    Enoxaparin Sodium (LOVENOX) syringe 40 mg, 40 mg, Subcutaneous, Daily, Manas Ragland MD    ketorolac (TORADOL) injection 15 mg, 15 mg, Intravenous, Q6H PRN, Manas Ragland MD    lactated ringers infusion, 100 mL/hr, Intravenous, Continuous, Manas Ragland MD, Last Rate: 100 mL/hr at 09/28/24 0430, 100 mL/hr at 09/28/24 0430    loperamide (IMODIUM) capsule 2 mg, 2 mg, Oral, 4x Daily PRN, Manas Ragland MD    LORazepam (ATIVAN) injection 1 mg, 1 mg, Intravenous, Q4H PRN, Manas Ragland MD    ondansetron (ZOFRAN) injection 4 mg, 4 mg, Intravenous, Q6H PRN, Manas Ragland MD    Pharmacy to dose vancomycin, , Does not apply, Continuous PRN, Manas Ragland MD    piperacillin-tazobactam (ZOSYN) IVPB 3.375 g IVPB in 100 mL NS (VTB), 3.375 g, Intravenous, Q8H, Manas Ragland MD, 3.375 g at 09/28/24 0420    saccharomyces boulardii (FLORASTOR) capsule 250 mg, 250 mg, Oral, BID, Manas Ragland MD, 250 mg at 09/27/24 2109    sodium chloride 0.9 % flush 10 mL, 10 mL, Intravenous, Q12H, Manas Ragland MD, 10 mL at 09/27/24 2110    sodium chloride 0.9 % flush 10 mL, 10 mL,  "Intravenous, PRN, Manas Ragland MD    sodium chloride 0.9 % infusion 40 mL, 40 mL, Intravenous, PRN, Manas Ragland MD    vancomycin IVPB 1500 mg in 0.9% NaCl (Premix) 500 mL, 1,500 mg, Intravenous, Q12H, Manas Ragland MD    No Known Allergies     History reviewed. No pertinent family history.    Social History     Socioeconomic History    Marital status: Single   Tobacco Use    Smoking status: Every Day     Current packs/day: 0.50     Average packs/day: 0.5 packs/day for 28.7 years (14.4 ttl pk-yrs)     Types: Cigarettes     Start date: 1996    Smokeless tobacco: Never   Vaping Use    Vaping status: Never Used   Substance and Sexual Activity    Alcohol use: Never    Drug use: Not Currently    Sexual activity: Defer       Vital Signs:   /66 (BP Location: Right arm, Patient Position: Lying)   Pulse 62   Temp 98.6 °F (37 °C) (Oral)   Resp 18   Ht 160 cm (63\")   Wt 100 kg (220 lb 7.4 oz)   SpO2 96%   BMI 39.05 kg/m²    Review of Systems    Physical Exam  Vitals and nursing note reviewed.   Constitutional:       General: She is not in acute distress.     Appearance: Normal appearance. She is well-developed.   HENT:      Head: Normocephalic and atraumatic.   Eyes:      Extraocular Movements: Extraocular movements intact.      Pupils: Pupils are equal, round, and reactive to light.   Cardiovascular:      Pulses: Normal pulses.   Pulmonary:      Effort: Pulmonary effort is normal. No retractions.      Breath sounds: Normal air entry. No wheezing.   Abdominal:      General: There is no distension.      Palpations: Abdomen is soft.      Tenderness: There is no abdominal tenderness.      Hernia: No hernia is present.   Musculoskeletal:         General: No swelling or deformity.      Cervical back: Neck supple.   Skin:     General: Skin is warm and dry.      Findings: No erythema.      Comments: Drained infection site on arm clean and dressed   Neurological:      General: No focal deficit present.      " Mental Status: She is alert and oriented to person, place, and time.      Motor: Motor function is intact.   Psychiatric:         Mood and Affect: Mood normal.         Thought Content: Thought content normal.          Result Review :               Assessment and Plan    Diagnoses and all orders for this visit:    1. Abscess of forearm, left (Primary)    Other orders  Continue local wound care  Looks good this AM and patient feels it is improved  Followup in outpatient wound care clinic      This document has been electronically signed by Gege Riddle MD  September 28, 2024 09:01 EDT

## 2024-09-28 NOTE — PROGRESS NOTES
"[facility] Clinical Pharmacy Services: Vancomycin Pharmacokinetic Consult Note    Farhana Vazquez is a 44 y.o. female who is on day 2 of pharmacy to dose vancomycin for Skin and Soft Tissue.    Consult Information  Consulting Provider: Ellyn  Planned Duration of Therapy: 7 days   Was Patient Receiving Prior to Admission/Consult?: No  Loading Dose Given: 2000 mg on  at 1518  PK/PD Target: -600 mg/L.hr   Other Antimicrobials: Piperacillin/Tazobactam    Imaging Reviewed?: Yes    Microbiology Data  MRSA PCR performed: No; Result: Not ordered due to excluded indication or presence of suspected abscess  Culture/Source:   -Blood culture(s): In process     Vitals/Labs  Ht: 160 cm (63\"); Wt: 100 kg (220 lb 7.4 oz)  Temp (24hrs), Av.5 °F (36.9 °C), Min:97.9 °F (36.6 °C), Max:99.5 °F (37.5 °C)   Estimated Creatinine Clearance: 136.3 mL/min (by C-G formula based on SCr of 0.6 mg/dL).       Results from last 7 days   Lab Units 24  0612 24  1441   VANCOMYCIN TR mcg/mL 15.29  --    CREATININE mg/dL  --  0.60   WBC 10*3/mm3  --  9.90     Assessment/Plan:  After assessing random level this AM will adjust dosing to 1500mg q12h to keep nephrotoxicity prediction below our desired threshold.  Vancomycin Dose:  1500 mg IV every 12 hours; which provides the following predicted parameters:  Exposure target: AUC24 (range)400-600 mg/L.hr   AUC24,ss: 568 mg/L.hr  Probability of AUC24 > 400: 96 %  Ctrough,ss: 17.3 mg/L  Probability of Ctrough,ss > 20: 33 %  Probability of nephrotoxicity (Lodise GILBERT ): 13 %  Vancomycin random level ordered for  at 0600  Patient has order for Basic Metabolic Panel    Pharmacy will follow patient's kidney function and will adjust doses and obtain levels as necessary. Thank you for involving pharmacy in this patient's care. Please contact pharmacy with any questions or concerns.                           Lyla Vogel  Clinical Pharmacist     "

## 2024-09-28 NOTE — H&P
[facility]   HISTORY AND PHYSICAL    Patient Name: Farhana Vazquez  : 1980  MRN: [id number]  Primary Care Physician:  Provider, No Known  Date of admission: 2024    Subjective   Subjective     Chief Complaint: Left arm abscess with cellulitis    HPI:    Farhana Vazquez is a 44 y.o. female with past medical history of IV drug use, anxiety, and GERD presenting to the ED for evaluation of a left arm abscess with surrounding cellulitis.  Is a IV drug user and likely missed a vein a few days ago on her left forearm.  2 days ago patient noticed some redness and swelling that quickly got worse with surrounding cellulitis and palpable abscess.  He also had some fevers and chills.  Due to concern she came to the ED for further evaluation.  In the ED patient's vitals were all within normal limits on arrival.  Labs showed mild anemia with remaining being relatively unremarkable including a normal lactic acid level.  X-ray of the forearm shows soft tissue changes on the proximal forearm laterally and dorsally.  In the ED patient had abscess drained.  When seen patient resting comfortably although in some pain.  She denied any headaches, focal weakness, chest pain, palpitation, shortness of breath, cough, abdominal pain, nausea, vomiting, diarrhea, constipation, dysuria, hematuria, hematochezia, melena, or anxiety.  Patient admitted for further evaluation and treatment.    Review of Systems   All systems were reviewed and negative except for: As per HPI    Personal History     History reviewed. No pertinent past medical history.    Past Surgical History:   Procedure Laterality Date   • CHOLECYSTECTOMY     • TUBAL ABDOMINAL LIGATION         Family History: family history is not on file. Otherwise pertinent FHx was reviewed and not pertinent to current issue.    Social History:  reports that she has been smoking cigarettes. She started smoking about 28 years ago. She has a 14.4 pack-year smoking history. She has  never used smokeless tobacco. She reports that she does not currently use drugs. She reports that she does not drink alcohol.    Home Medications:         Allergies:  No Known Allergies    Objective   Objective     Vitals:   Temp:  [97.9 °F (36.6 °C)-98.6 °F (37 °C)] 98.6 °F (37 °C)  Heart Rate:  [57-61] 61  Resp:  [18-20] 20  BP: (120-142)/(54-61) 142/61  Physical Exam    Constitutional: Awake, alert   Eyes: PERRLA, sclerae anicteric, no conjunctival injection   HENT: NCAT, mucous membranes moist   Neck: Supple, no thyromegaly, no lymphadenopathy, trachea midline   Respiratory: Clear to auscultation bilaterally, nonlabored respirations    Cardiovascular: RRR, systolic murmur, no rubs, or gallops, palpable pedal pulses bilaterally   Gastrointestinal: Positive bowel sounds, soft, nontender, nondistended   Musculoskeletal: No bilateral ankle edema, no clubbing or cyanosis to extremities   Psychiatric: Appropriate affect, cooperative   Neurologic: Oriented x 3, strength symmetric in all extremities, Cranial Nerves grossly intact to confrontation, speech clear   Skin: Status post incision and drainage.  Area of erythema, edema, and tenderness at left forearm.          Result Review    Result Review:  I have personally reviewed the results from the time of this admission to 9/27/2024 21:59 EDT and agree with these findings:  [x]  Laboratory list / accordion  []  Microbiology  []  Radiology  [x]  EKG/Telemetry   [x]  Cardiology/Vascular   []  Pathology  []  Old records  []  Other:  Most notable findings include: Lactic acid, microcytic anemia, x-ray with tissue edema      Assessment & Plan   Assessment / Plan     Brief Patient Summary:  Farhana Vazquez is a 44 y.o. female with past medical history of IV drug use, anxiety, and GERD presenting to the ED for evaluation of a left arm abscess with surrounding cellulitis    Active Hospital Problems:  Active Hospital Problems    Diagnosis    • **Cellulitis    • Abscess of  forearm, left    • IV drug user      Plan:     Abscess with cellulitis  -Admit to telemetry  -Status post incision and drainage  -Patient IV drug user.  Missed vein  -X-ray reviewed  -Empiric antibiotics  -Blood cultures  -Pain meds as needed  -Murmur noted on physical exam  -Echo if blood cultures positive  -Will follow along    IV drug user  -Patient admits to meth and opiate use  -Withdrawal medications as needed  -IVF  - if needed      GI ppx  DVT ppx      VTE Prophylaxis:  Pharmacologic VTE prophylaxis orders are present.        CODE STATUS:    Level Of Support Discussed With: Patient  Code Status (Patient has no pulse and is not breathing): CPR (Attempt to Resuscitate)  Medical Interventions (Patient has pulse or is breathing): Full Support    Admission Status:  I believe this patient meets inpatient status.      Electronically signed by Manas Ragland MD, 09/27/24, 9:59 PM EDT.

## 2024-09-29 LAB
ANION GAP SERPL CALCULATED.3IONS-SCNC: 8.2 MMOL/L (ref 5–15)
BASOPHILS # BLD AUTO: 0.01 10*3/MM3 (ref 0–0.2)
BASOPHILS NFR BLD AUTO: 0.2 % (ref 0–1.5)
BUN SERPL-MCNC: 4 MG/DL (ref 6–20)
BUN/CREAT SERPL: 6.9 (ref 7–25)
CALCIUM SPEC-SCNC: 8.4 MG/DL (ref 8.6–10.5)
CHLORIDE SERPL-SCNC: 104 MMOL/L (ref 98–107)
CO2 SERPL-SCNC: 22.8 MMOL/L (ref 22–29)
CREAT SERPL-MCNC: 0.58 MG/DL (ref 0.57–1)
DEPRECATED RDW RBC AUTO: 43.1 FL (ref 37–54)
EGFRCR SERPLBLD CKD-EPI 2021: 115.3 ML/MIN/1.73
EOSINOPHIL # BLD AUTO: 0.3 10*3/MM3 (ref 0–0.4)
EOSINOPHIL NFR BLD AUTO: 5.3 % (ref 0.3–6.2)
ERYTHROCYTE [DISTWIDTH] IN BLOOD BY AUTOMATED COUNT: 15.9 % (ref 12.3–15.4)
GLUCOSE SERPL-MCNC: 104 MG/DL (ref 65–99)
HCT VFR BLD AUTO: 29.4 % (ref 34–46.6)
HGB BLD-MCNC: 9.1 G/DL (ref 12–15.9)
IMM GRANULOCYTES # BLD AUTO: 0.03 10*3/MM3 (ref 0–0.05)
IMM GRANULOCYTES NFR BLD AUTO: 0.5 % (ref 0–0.5)
LYMPHOCYTES # BLD AUTO: 1.2 10*3/MM3 (ref 0.7–3.1)
LYMPHOCYTES NFR BLD AUTO: 21 % (ref 19.6–45.3)
MCH RBC QN AUTO: 23.1 PG (ref 26.6–33)
MCHC RBC AUTO-ENTMCNC: 31 G/DL (ref 31.5–35.7)
MCV RBC AUTO: 74.6 FL (ref 79–97)
MONOCYTES # BLD AUTO: 0.35 10*3/MM3 (ref 0.1–0.9)
MONOCYTES NFR BLD AUTO: 6.1 % (ref 5–12)
MRSA DNA SPEC QL NAA+PROBE: NORMAL
NEUTROPHILS NFR BLD AUTO: 3.82 10*3/MM3 (ref 1.7–7)
NEUTROPHILS NFR BLD AUTO: 66.9 % (ref 42.7–76)
NRBC BLD AUTO-RTO: 0 /100 WBC (ref 0–0.2)
PLATELET # BLD AUTO: 242 10*3/MM3 (ref 140–450)
PMV BLD AUTO: 10.4 FL (ref 6–12)
POTASSIUM SERPL-SCNC: 3.7 MMOL/L (ref 3.5–5.2)
RBC # BLD AUTO: 3.94 10*6/MM3 (ref 3.77–5.28)
SODIUM SERPL-SCNC: 135 MMOL/L (ref 136–145)
VANCOMYCIN SERPL-MCNC: 18.92 MCG/ML (ref 5–40)
WBC NRBC COR # BLD AUTO: 5.71 10*3/MM3 (ref 3.4–10.8)

## 2024-09-29 PROCEDURE — 96376 TX/PRO/DX INJ SAME DRUG ADON: CPT

## 2024-09-29 PROCEDURE — 85025 COMPLETE CBC W/AUTO DIFF WBC: CPT | Performed by: STUDENT IN AN ORGANIZED HEALTH CARE EDUCATION/TRAINING PROGRAM

## 2024-09-29 PROCEDURE — 80202 ASSAY OF VANCOMYCIN: CPT | Performed by: FAMILY MEDICINE

## 2024-09-29 PROCEDURE — 96375 TX/PRO/DX INJ NEW DRUG ADDON: CPT

## 2024-09-29 PROCEDURE — 80048 BASIC METABOLIC PNL TOTAL CA: CPT | Performed by: FAMILY MEDICINE

## 2024-09-29 PROCEDURE — 25010000002 KETOROLAC TROMETHAMINE PER 15 MG: Performed by: FAMILY MEDICINE

## 2024-09-29 PROCEDURE — 25810000003 SODIUM CHLORIDE 0.9 % SOLUTION: Performed by: FAMILY MEDICINE

## 2024-09-29 PROCEDURE — 25010000002 VANCOMYCIN 5 G RECONSTITUTED SOLUTION: Performed by: FAMILY MEDICINE

## 2024-09-29 PROCEDURE — 99232 SBSQ HOSP IP/OBS MODERATE 35: CPT | Performed by: STUDENT IN AN ORGANIZED HEALTH CARE EDUCATION/TRAINING PROGRAM

## 2024-09-29 PROCEDURE — 25010000002 ONDANSETRON PER 1 MG: Performed by: FAMILY MEDICINE

## 2024-09-29 PROCEDURE — 25010000002 LORAZEPAM PER 2 MG: Performed by: FAMILY MEDICINE

## 2024-09-29 PROCEDURE — 87641 MR-STAPH DNA AMP PROBE: CPT | Performed by: STUDENT IN AN ORGANIZED HEALTH CARE EDUCATION/TRAINING PROGRAM

## 2024-09-29 PROCEDURE — 25010000002 PIPERACILLIN SOD-TAZOBACTAM PER 1 G: Performed by: FAMILY MEDICINE

## 2024-09-29 PROCEDURE — 97602 WOUND(S) CARE NON-SELECTIVE: CPT

## 2024-09-29 RX ORDER — METHADONE HYDROCHLORIDE 10 MG/1
20 TABLET ORAL EVERY 24 HOURS
Status: DISCONTINUED | OUTPATIENT
Start: 2024-09-29 | End: 2024-09-30 | Stop reason: HOSPADM

## 2024-09-29 RX ORDER — NICOTINE 21 MG/24HR
1 PATCH, TRANSDERMAL 24 HOURS TRANSDERMAL
Status: DISCONTINUED | OUTPATIENT
Start: 2024-09-29 | End: 2024-09-30 | Stop reason: HOSPADM

## 2024-09-29 RX ADMIN — LORAZEPAM 1 MG: 2 INJECTION INTRAMUSCULAR; INTRAVENOUS at 00:37

## 2024-09-29 RX ADMIN — PIPERACILLIN AND TAZOBACTAM 3.38 G: 3; .375 INJECTION, POWDER, FOR SOLUTION INTRAVENOUS at 04:26

## 2024-09-29 RX ADMIN — VANCOMYCIN HYDROCHLORIDE 1500 MG: 5 INJECTION, POWDER, LYOPHILIZED, FOR SOLUTION INTRAVENOUS at 11:13

## 2024-09-29 RX ADMIN — PIPERACILLIN AND TAZOBACTAM 3.38 G: 3; .375 INJECTION, POWDER, FOR SOLUTION INTRAVENOUS at 13:43

## 2024-09-29 RX ADMIN — Medication 250 MG: at 20:10

## 2024-09-29 RX ADMIN — KETOROLAC TROMETHAMINE 15 MG: 15 INJECTION, SOLUTION INTRAMUSCULAR; INTRAVENOUS at 20:36

## 2024-09-29 RX ADMIN — ONDANSETRON 4 MG: 2 INJECTION INTRAMUSCULAR; INTRAVENOUS at 15:33

## 2024-09-29 RX ADMIN — NICOTINE 1 PATCH: 14 PATCH, EXTENDED RELEASE TRANSDERMAL at 13:59

## 2024-09-29 RX ADMIN — LORAZEPAM 1 MG: 2 INJECTION INTRAMUSCULAR; INTRAVENOUS at 08:59

## 2024-09-29 RX ADMIN — LORAZEPAM 1 MG: 2 INJECTION INTRAMUSCULAR; INTRAVENOUS at 20:26

## 2024-09-29 RX ADMIN — LORAZEPAM 1 MG: 2 INJECTION INTRAMUSCULAR; INTRAVENOUS at 13:30

## 2024-09-29 RX ADMIN — PIPERACILLIN AND TAZOBACTAM 3.38 G: 3; .375 INJECTION, POWDER, FOR SOLUTION INTRAVENOUS at 20:09

## 2024-09-29 RX ADMIN — METHADONE HYDROCHLORIDE 20 MG: 10 TABLET ORAL at 21:51

## 2024-09-29 RX ADMIN — LOPERAMIDE HYDROCHLORIDE 2 MG: 2 CAPSULE ORAL at 08:59

## 2024-09-29 RX ADMIN — Medication 250 MG: at 08:59

## 2024-09-29 NOTE — PROGRESS NOTES
[facility]   Hospitalist Progress Note  Date: 2024  Patient Name: Farhana Vazquez  : 1980  MRN: [id number]  Date of admission: 2024  Room[place]Bed: [facility] [place] 2]      Subjective   Subjective     Chief Complaint: Cellulitis    Summary:Farhana Vazquez is a 44 y.o. female with past medical history of IV drug use, anxiety, and GERD presenting to the ED for evaluation of a left arm abscess with surrounding cellulitis. Is a IV drug user and likely missed a vein a few days ago on her left forearm. 2 days ago patient noticed some redness and swelling that quickly got worse with surrounding cellulitis and palpable abscess. He also had some fevers and chills. Due to concern she came to the ED for further evaluation. In the ED patient's vitals were all within normal limits on arrival. Labs showed mild anemia with remaining being relatively unremarkable including a normal lactic acid level. X-ray of the forearm shows soft tissue changes on the proximal forearm laterally and dorsally. In the ED patient had abscess drained. When seen patient resting comfortably although in some pain. She denied any headaches, focal weakness, chest pain, palpitation, shortness of breath, cough, abdominal pain, nausea, vomiting, diarrhea, constipation, dysuria, hematuria, hematochezia, melena, or anxiety. Patient admitted for further evaluation and treatment.     Interval Followup: No acute overnight events.  No acute distress.  Patient resting comfortably in bed.  Asking for nicotine patch.    Review of Systems    All systems reviewed and negative except for what is outlined above.      Objective   Objective     Vitals:   Temp:  [97.7 °F (36.5 °C)-98.2 °F (36.8 °C)] 97.9 °F (36.6 °C)  Heart Rate:  [61-72] 63  Resp:  [18] 18  BP: (119-144)/(57-85) 144/73    Physical Exam   Gen: NAD, Alert and Oriented  Cards: RRR, no murmur   Pulm: CTA b/l, no wheezing  Abd: soft, nondistended  Extremities: no pitting edema    Result Review    Result  Review:  I have personally reviewed these results:  [x]  Laboratory      Lab 09/29/24  0524 09/27/24  1441   WBC 5.71 9.90   HEMOGLOBIN 9.1* 10.9*   HEMATOCRIT 29.4* 35.5   PLATELETS 242 290   NEUTROS ABS 3.82 7.41*   IMMATURE GRANS (ABS) 0.03 0.04   LYMPHS ABS 1.20 1.81   MONOS ABS 0.35 0.38   EOS ABS 0.30 0.25   MCV 74.6* 74.7*   LACTATE  --  1.4         Lab 09/29/24  0524 09/27/24  1441   SODIUM 135* 136   POTASSIUM 3.7 3.5   CHLORIDE 104 98   CO2 22.8 28.2   ANION GAP 8.2 9.8   BUN 4* 6   CREATININE 0.58 0.60   EGFR 115.3 114.4   GLUCOSE 104* 123*   CALCIUM 8.4* 9.4         Lab 09/27/24  1441   TOTAL PROTEIN 7.3   ALBUMIN 3.5   GLOBULIN 3.8   ALT (SGPT) 20   AST (SGOT) 17   BILIRUBIN 0.7   ALK PHOS 88                 Lab 09/28/24  0612 09/27/24  1441   IRON  --  19*   IRON SATURATION (TSAT)  --  5*   TIBC  --  374   TRANSFERRIN  --  251   FERRITIN 45.49  --          Brief Urine Lab Results       None          [x]  Microbiology   Microbiology Results (last 10 days)       Procedure Component Value - Date/Time    Blood Culture - Blood, Arm, Right [[id number]]  (Normal) Collected: 09/27/24 1441    Lab Status: Preliminary result Specimen: Blood from Arm, Right Updated: 09/28/24 1500     Blood Culture No growth at 24 hours    Blood Culture - Blood, Arm, Left [[id number]]  (Normal) Collected: 09/27/24 1441    Lab Status: Preliminary result Specimen: Blood from Arm, Left Updated: 09/28/24 1500     Blood Culture No growth at 24 hours          [x]  Radiology  XR Forearm 2 View Left    Result Date: 9/27/2024  Impression: 1.Soft tissue changes along the proximal forearm more laterally and dorsally that could relate to reported abscess. Electronically Signed: Ap Garza MD  9/27/2024 1:49 PM EDT  Workstation ID: [id number]   []  EKG/Telemetry   []  Cardiology/Vascular   []  Pathology  []  Old records  []  Other:    Assessment & Plan   Assessment / Plan     Assessment:  Left arm cellulitis with abscess  S/p I&D  IV drug  abuse  Iron deficiency  Microcytic anemia  Heart murmur     Plan:  Continue inpatient admission and care  Continue IV vancomycin and Zosyn  S/p incision and drainage in ED.  Unfortunately, no cultures were sent.  Blood cultures negative at 24 hours  Murmur noted on physical exam.  Pending blood culture results will get echo.  Microcytic anemia with hemoglobin 10.9.  Iron 19/sat 5/ferritin 45.  IV iron x 3 ordered.  Discussed with the patient that she needs to have a colonoscopy.  Screen for hepatitis and HIV  General Surgery consulted.  A.m. labs     Disposition: Plan to DC home with oral antibiotics tomorrow if blood cultures negative at 48 hours    Discussed with RN.    VTE Prophylaxis:  Pharmacologic VTE prophylaxis orders are present.        CODE STATUS:   Level Of Support Discussed With: Patient  Code Status (Patient has no pulse and is not breathing): CPR (Attempt to Resuscitate)  Medical Interventions (Patient has pulse or is breathing): Full Support      Electronically signed by Viviane Mack DO, 9/29/2024, 14:53 EDT.

## 2024-09-29 NOTE — PROGRESS NOTES
"[facility] Services: Vancomycin Pharmacokinetic Consult Note    Farhana Vazquez is a 44 y.o. female who is on day 3 of pharmacy to dose vancomycin for Skin and Soft Tissue.    Consult Information  Consulting Provider: Ellyn  Planned Duration of Therapy: 7 days   Was Patient Receiving Prior to Admission/Consult?: No  Loading Dose Given: 2000 mg on  at 1518  PK/PD Target: -600 mg/L.hr   Other Antimicrobials: Piperacillin/Tazobactam    Imaging Reviewed?: Yes    Microbiology Data  MRSA PCR performed: No; Result: Not ordered due to excluded indication or presence of suspected abscess  Culture/Source:   -Blood culture(s): NGTD    Vitals/Labs  Ht: 160 cm (63\"); Wt: 100 kg (220 lb 7.4 oz)  Temp (24hrs), Av.9 °F (36.6 °C), Min:97.3 °F (36.3 °C), Max:98.2 °F (36.8 °C)   Estimated Creatinine Clearance: 141 mL/min (by C-G formula based on SCr of 0.58 mg/dL).       Results from last 7 days   Lab Units 24  0524 24  0612 24  1441   VANCOMYCIN RM mcg/mL 18.92  --   --    VANCOMYCIN TR mcg/mL  --  15.29  --    CREATININE mg/dL 0.58  --  0.60   WBC 10*3/mm3 5.71  --  9.90     Assessment/Plan:  Current dosing of 1500mg q12h appears adequate after evaluating random level this AM..    Vancomycin Dose:  1500 mg IV every 12 hours; which provides the following UPDATED predicted parameters:  Exposure target: AUC24 (range)400-600 mg/L.hr     AUC24,ss: 538 mg/L.hr  Probability of AUC24 > 400: 98 %  Ctrough,ss: 16.2 mg/L  Probability of Ctrough,ss > 20: 20 %  Probability of nephrotoxicity (Lodise GILBERT ): 12 %    No new level at this time.  Patient has order for Basic Metabolic Panel    Pharmacy will follow patient's kidney function and will adjust doses and obtain levels as necessary. Thank you for involving pharmacy in this patient's care. Please contact pharmacy with any questions or concerns.                           Lyla Vogel  Clinical Pharmacist       "

## 2024-09-29 NOTE — PLAN OF CARE
"Goal Outcome Evaluation:           Progress: improving  Outcome Evaluation: Pt alert and oriented this shift. Pt wound care completed. Pt complaints of diarrhea and took 1 dose of prn antidiarrheal. Pt tolerating po intake but complains of low appetite due to \"food tastes weird\". Pt refused iron infusion today saying that it made her \"mouth/food taste weird\". Pt educated on need for iron but did not agree to take. Pt had one episode of nausea and took prn nausea medication x1. Pt up to [facility] ad [facility]. Pt complains of anxiety and given doses of ativan prn. Will continue to monitor.    Aida Araiza RN                             "

## 2024-09-29 NOTE — PLAN OF CARE
Goal Outcome Evaluation:  Plan of Care Reviewed With: patient      Pt had periods of severe restlessness this shift,  Ativan given as ordered x2. Pt has been resting well (in between nursing care) after last dose 0037. Alert and oriented x4. Verbalizing no pain with left arm wound. Monitoring right arm IV, no issues with antibiotics. Refused a bath.

## 2024-09-30 ENCOUNTER — READMISSION MANAGEMENT (OUTPATIENT)
Dept: CALL CENTER | Facility: HOSPITAL | Age: 44
End: 2024-09-30
Payer: COMMERCIAL

## 2024-09-30 VITALS
HEIGHT: 63 IN | TEMPERATURE: 98.1 F | HEART RATE: 60 BPM | SYSTOLIC BLOOD PRESSURE: 147 MMHG | OXYGEN SATURATION: 100 % | WEIGHT: 220.46 LBS | DIASTOLIC BLOOD PRESSURE: 78 MMHG | RESPIRATION RATE: 20 BRPM | BODY MASS INDEX: 39.06 KG/M2

## 2024-09-30 LAB
ANION GAP SERPL CALCULATED.3IONS-SCNC: 13.6 MMOL/L (ref 5–15)
BASOPHILS # BLD AUTO: 0.02 10*3/MM3 (ref 0–0.2)
BASOPHILS NFR BLD AUTO: 0.2 % (ref 0–1.5)
BUN SERPL-MCNC: 5 MG/DL (ref 6–20)
BUN/CREAT SERPL: 5.1 (ref 7–25)
CALCIUM SPEC-SCNC: 8.7 MG/DL (ref 8.6–10.5)
CHLORIDE SERPL-SCNC: 103 MMOL/L (ref 98–107)
CO2 SERPL-SCNC: 19.4 MMOL/L (ref 22–29)
CREAT SERPL-MCNC: 0.98 MG/DL (ref 0.57–1)
DEPRECATED RDW RBC AUTO: 45 FL (ref 37–54)
EGFRCR SERPLBLD CKD-EPI 2021: 73.6 ML/MIN/1.73
EOSINOPHIL # BLD AUTO: 0.26 10*3/MM3 (ref 0–0.4)
EOSINOPHIL NFR BLD AUTO: 3.2 % (ref 0.3–6.2)
ERYTHROCYTE [DISTWIDTH] IN BLOOD BY AUTOMATED COUNT: 15.9 % (ref 12.3–15.4)
GLUCOSE SERPL-MCNC: 88 MG/DL (ref 65–99)
HCT VFR BLD AUTO: 34.8 % (ref 34–46.6)
HGB BLD-MCNC: 10.3 G/DL (ref 12–15.9)
IMM GRANULOCYTES # BLD AUTO: 0.02 10*3/MM3 (ref 0–0.05)
IMM GRANULOCYTES NFR BLD AUTO: 0.2 % (ref 0–0.5)
LYMPHOCYTES # BLD AUTO: 1.62 10*3/MM3 (ref 0.7–3.1)
LYMPHOCYTES NFR BLD AUTO: 20 % (ref 19.6–45.3)
MCH RBC QN AUTO: 22.9 PG (ref 26.6–33)
MCHC RBC AUTO-ENTMCNC: 29.6 G/DL (ref 31.5–35.7)
MCV RBC AUTO: 77.3 FL (ref 79–97)
MONOCYTES # BLD AUTO: 0.56 10*3/MM3 (ref 0.1–0.9)
MONOCYTES NFR BLD AUTO: 6.9 % (ref 5–12)
NEUTROPHILS NFR BLD AUTO: 5.62 10*3/MM3 (ref 1.7–7)
NEUTROPHILS NFR BLD AUTO: 69.5 % (ref 42.7–76)
NRBC BLD AUTO-RTO: 0 /100 WBC (ref 0–0.2)
PLATELET # BLD AUTO: 277 10*3/MM3 (ref 140–450)
PMV BLD AUTO: 10 FL (ref 6–12)
POTASSIUM SERPL-SCNC: 3.5 MMOL/L (ref 3.5–5.2)
RBC # BLD AUTO: 4.5 10*6/MM3 (ref 3.77–5.28)
SODIUM SERPL-SCNC: 136 MMOL/L (ref 136–145)
WBC NRBC COR # BLD AUTO: 8.1 10*3/MM3 (ref 3.4–10.8)

## 2024-09-30 PROCEDURE — 80048 BASIC METABOLIC PNL TOTAL CA: CPT | Performed by: STUDENT IN AN ORGANIZED HEALTH CARE EDUCATION/TRAINING PROGRAM

## 2024-09-30 PROCEDURE — 25010000002 PROCHLORPERAZINE 10 MG/2ML SOLUTION: Performed by: STUDENT IN AN ORGANIZED HEALTH CARE EDUCATION/TRAINING PROGRAM

## 2024-09-30 PROCEDURE — 85025 COMPLETE CBC W/AUTO DIFF WBC: CPT | Performed by: STUDENT IN AN ORGANIZED HEALTH CARE EDUCATION/TRAINING PROGRAM

## 2024-09-30 PROCEDURE — 25010000002 LORAZEPAM PER 2 MG: Performed by: FAMILY MEDICINE

## 2024-09-30 PROCEDURE — 99239 HOSP IP/OBS DSCHRG MGMT >30: CPT | Performed by: STUDENT IN AN ORGANIZED HEALTH CARE EDUCATION/TRAINING PROGRAM

## 2024-09-30 PROCEDURE — 96376 TX/PRO/DX INJ SAME DRUG ADON: CPT

## 2024-09-30 PROCEDURE — 97602 WOUND(S) CARE NON-SELECTIVE: CPT

## 2024-09-30 PROCEDURE — 25010000002 VANCOMYCIN 5 G RECONSTITUTED SOLUTION: Performed by: FAMILY MEDICINE

## 2024-09-30 PROCEDURE — 63710000001 ONDANSETRON ODT 4 MG TABLET DISPERSIBLE: Performed by: STUDENT IN AN ORGANIZED HEALTH CARE EDUCATION/TRAINING PROGRAM

## 2024-09-30 PROCEDURE — 25010000002 ONDANSETRON PER 1 MG: Performed by: FAMILY MEDICINE

## 2024-09-30 PROCEDURE — 25010000002 PIPERACILLIN SOD-TAZOBACTAM PER 1 G: Performed by: FAMILY MEDICINE

## 2024-09-30 PROCEDURE — 25810000003 SODIUM CHLORIDE 0.9 % SOLUTION: Performed by: FAMILY MEDICINE

## 2024-09-30 RX ORDER — SULFAMETHOXAZOLE/TRIMETHOPRIM 800-160 MG
1 TABLET ORAL EVERY 12 HOURS SCHEDULED
Qty: 6 TABLET | Refills: 0 | Status: SHIPPED | OUTPATIENT
Start: 2024-09-30 | End: 2024-10-03

## 2024-09-30 RX ORDER — FERROUS GLUCONATE 324(38)MG
324 TABLET ORAL
Qty: 30 TABLET | Refills: 0 | Status: SHIPPED | OUTPATIENT
Start: 2024-09-30

## 2024-09-30 RX ORDER — ONDANSETRON 4 MG/1
4 TABLET, ORALLY DISINTEGRATING ORAL EVERY 6 HOURS PRN
Qty: 10 TABLET | Refills: 0 | Status: SHIPPED | OUTPATIENT
Start: 2024-09-30

## 2024-09-30 RX ORDER — PROCHLORPERAZINE EDISYLATE 5 MG/ML
5 INJECTION INTRAMUSCULAR; INTRAVENOUS EVERY 6 HOURS PRN
Status: DISCONTINUED | OUTPATIENT
Start: 2024-09-30 | End: 2024-09-30 | Stop reason: HOSPADM

## 2024-09-30 RX ORDER — SULFAMETHOXAZOLE/TRIMETHOPRIM 800-160 MG
1 TABLET ORAL EVERY 12 HOURS SCHEDULED
Status: DISCONTINUED | OUTPATIENT
Start: 2024-09-30 | End: 2024-09-30 | Stop reason: HOSPADM

## 2024-09-30 RX ORDER — ONDANSETRON 4 MG/1
4 TABLET, ORALLY DISINTEGRATING ORAL EVERY 6 HOURS PRN
Status: DISCONTINUED | OUTPATIENT
Start: 2024-09-30 | End: 2024-09-30 | Stop reason: HOSPADM

## 2024-09-30 RX ADMIN — PIPERACILLIN AND TAZOBACTAM 3.38 G: 3; .375 INJECTION, POWDER, FOR SOLUTION INTRAVENOUS at 04:46

## 2024-09-30 RX ADMIN — ONDANSETRON 4 MG: 2 INJECTION INTRAMUSCULAR; INTRAVENOUS at 05:52

## 2024-09-30 RX ADMIN — Medication 250 MG: at 10:41

## 2024-09-30 RX ADMIN — ONDANSETRON 4 MG: 4 TABLET, ORALLY DISINTEGRATING ORAL at 10:40

## 2024-09-30 RX ADMIN — LORAZEPAM 1 MG: 2 INJECTION INTRAMUSCULAR; INTRAVENOUS at 04:55

## 2024-09-30 RX ADMIN — VANCOMYCIN HYDROCHLORIDE 1500 MG: 5 INJECTION, POWDER, LYOPHILIZED, FOR SOLUTION INTRAVENOUS at 00:25

## 2024-09-30 RX ADMIN — Medication 10 ML: at 00:26

## 2024-09-30 NOTE — PLAN OF CARE
Goal Outcome Evaluation:  Plan of Care Reviewed With: patient        Progress: no change PT A&O times 4 pleasant and cooperative pt with c/o pain and jerking in her legs and general pain all over PT stated she was on methadone before she went to rehab and is having withdraws pt feels like she needs a detox rehab when she does discharge notified Dr Ragland with new orders for Methadone, administered to PT with effective results pt stated she got a good nights sleep for the first time in months, dsg changed to left arm mod amt of serous drainage pt tolerated well no adverse reactions from IV ABT cont POC

## 2024-09-30 NOTE — DISCHARGE SUMMARY
[facility]         HOSPITALIST  DISCHARGE SUMMARY    Patient Name: Farhana Vazquez  : 1980  MRN: [id number]    Date of Admission: 2024  Date of Discharge:  2024    Primary Care Physician: Provider, No Known    Consults       Date and Time Order Name Status Description    2024  2:23 PM IP Consult to General Surgery Completed             Active and Resolved Hospital Problems:  Active Hospital Problems    Diagnosis POA    **Cellulitis [L03.90] Yes    Abscess of forearm, left [L02.414] Unknown    IV drug user [F19.90] Unknown      Resolved Hospital Problems   No resolved problems to display.       Hospital Course     Hospital Course:  Farhana Vazquez is a 44 y.o. female with past medical history of IV drug use, anxiety, and GERD presenting to the ED for evaluation of a left arm abscess with surrounding cellulitis. Is a IV drug user and likely missed a vein. 2 days ago patient noticed some redness and swelling that quickly got worse with surrounding cellulitis and palpable abscess. Also had some fevers and chills.     Patient admitted for abscess/cellulitis.  She underwent I&D in the ED.  Started on broad-spectrum IV antibiotics. X-ray shows soft tissue changes on the proximal forearm laterally and dorsally. Blood cultures negative at 48 hours.  Noted to be anemic, labs showed significant iron deficiency. She received IV iron x 3.  Discussed with her at bedside that she needs to see GI for colonoscopy.  Transition from IV antibiotics to p.o. antibiotics.  Asking for methadone at discharge, however, she has not been on this recently.    Patient discharged in stable condition.  High risk for readmission given that she was refusing her antibiotics prior to discharge and refusing to go back to rehab.    DISCHARGE Follow Up Recommendations for labs and diagnostics: Follow-up with PCP in 1 week.  Ambulatory referral to GI placed.      Day of Discharge     Vital Signs:  Temp:  [97.9 °F  (36.6 °C)-99.4 °F (37.4 °C)] 98.1 °F (36.7 °C)  Heart Rate:  [60-76] 60  Resp:  [18-20] 20  BP: (132-147)/(63-85) 147/78    Physical Exam:   Gen: NAD, Alert and Oriented  Cards: RRR, no murmur   Pulm: CTA b/l, no wheezing  Abd: soft, nondistended  Extremities: no pitting edema      Discharge Details        Discharge Medications        New Medications        Instructions Start Date   ferrous gluconate 324 MG tablet  Commonly known as: FERGON   324 mg, Oral, Daily With Breakfast      ondansetron ODT 4 MG disintegrating tablet  Commonly known as: ZOFRAN-ODT   4 mg, Translingual, Every 6 Hours PRN      sulfamethoxazole-trimethoprim 800-160 MG per tablet  Commonly known as: BACTRIM DS,SEPTRA DS   1 tablet, Oral, Every 12 Hours Scheduled               No Known Allergies    Discharge Disposition:  Home or Self Care    Diet:  Hospital:No active diet order      Discharge Activity:       CODE STATUS:  Code Status and Medical Interventions: CPR (Attempt to Resuscitate); Full Support   Ordered at: 09/27/24 1607     Level Of Support Discussed With:    Patient     Code Status (Patient has no pulse and is not breathing):    CPR (Attempt to Resuscitate)     Medical Interventions (Patient has pulse or is breathing):    Full Support         No future appointments.    Additional Instructions for the Follow-ups that You Need to Schedule       Discharge Follow-up with PCP   As directed       Currently Documented PCP:    Provider, No Known    PCP Phone Number:    None     Follow Up Details: one week                Pertinent  and/or Most Recent Results         LAB RESULTS:      Lab 09/30/24  0527 09/29/24  0524 09/27/24  1441   WBC 8.10 5.71 9.90   HEMOGLOBIN 10.3* 9.1* 10.9*   HEMATOCRIT 34.8 29.4* 35.5   PLATELETS 277 242 290   NEUTROS ABS 5.62 3.82 7.41*   IMMATURE GRANS (ABS) 0.02 0.03 0.04   LYMPHS ABS 1.62 1.20 1.81   MONOS ABS 0.56 0.35 0.38   EOS ABS 0.26 0.30 0.25   MCV 77.3* 74.6* 74.7*   LACTATE  --   --  1.4         Lab  09/30/24  0527 09/29/24  0524 09/27/24  1441   SODIUM 136 135* 136   POTASSIUM 3.5 3.7 3.5   CHLORIDE 103 104 98   CO2 19.4* 22.8 28.2   ANION GAP 13.6 8.2 9.8   BUN 5* 4* 6   CREATININE 0.98 0.58 0.60   EGFR 73.6 115.3 114.4   GLUCOSE 88 104* 123*   CALCIUM 8.7 8.4* 9.4         Lab 09/27/24  1441   TOTAL PROTEIN 7.3   ALBUMIN 3.5   GLOBULIN 3.8   ALT (SGPT) 20   AST (SGOT) 17   BILIRUBIN 0.7   ALK PHOS 88                 Lab 09/28/24  0612 09/27/24  1441   IRON  --  19*   IRON SATURATION (TSAT)  --  5*   TIBC  --  374   TRANSFERRIN  --  251   FERRITIN 45.49  --          Brief Urine Lab Results       None          Microbiology Results (last 10 days)       Procedure Component Value - Date/Time    MRSA Screen, PCR (Inpatient) - Swab, Nares [[id number]]  (Normal) Collected: 09/29/24 1359    Lab Status: Final result Specimen: Swab from Nares Updated: 09/29/24 1726     MRSA PCR No MRSA Detected    Narrative:      The negative predictive value of this diagnostic test is high and should only be used to consider de-escalating anti-MRSA therapy. A positive result may indicate colonization with MRSA and must be correlated clinically.    Blood Culture - Blood, Arm, Right [[id number]]  (Normal) Collected: 09/27/24 1441    Lab Status: Preliminary result Specimen: Blood from Arm, Right Updated: 09/29/24 1500     Blood Culture No growth at 2 days    Blood Culture - Blood, Arm, Left [[id number]]  (Normal) Collected: 09/27/24 1441    Lab Status: Preliminary result Specimen: Blood from Arm, Left Updated: 09/29/24 1500     Blood Culture No growth at 2 days            XR Forearm 2 View Left    Result Date: 9/27/2024  Impression: 1.Soft tissue changes along the proximal forearm more laterally and dorsally that could relate to reported abscess. Electronically Signed: Ap Garza MD  9/27/2024 1:49 PM EDT  Workstation ID: [id number]                    Time spent on Discharge including face to face service:  >30 minutes    Electronically  signed by Viviane Mack DO, 09/30/24, 1:27 PM EDT.

## 2024-09-30 NOTE — PLAN OF CARE
Goal Outcome Evaluation:  Plan of Care Reviewed With: patient        Progress: no change  Outcome Evaluation: wound care performed by wound care team this shift. aox4 on room air. nausea and vomiting noted, MD aware, medicated PRN per the MAR. no complaints of pain. pt refusing antibiotics, educated but still refused. discussed [facility] with patient, patient not happy about that choice and states she cannot get her methadone there. IV removed. education completed.

## 2024-09-30 NOTE — SIGNIFICANT NOTE
Wound Eval / Progress Noted    [facility]     Patient Name: Farhana Vazquez  : 1980  MRN: [id number]  Today's Date: 2024                 Admit Date: 2024    Visit Dx:    ICD-10-CM ICD-9-CM   1. Abscess of forearm, left  L02.414 682.3   2. Iron deficiency anemia, unspecified iron deficiency anemia type  D50.9 280.9         Cellulitis    Abscess of forearm, left    IV drug user        History reviewed. No pertinent past medical history.     Past Surgical History:   Procedure Laterality Date    CHOLECYSTECTOMY      TUBAL ABDOMINAL LIGATION           Physical Assessment:  Wound 24 1100 Left lower arm Abcess (Active)   Wound Image   24   Dressing Appearance moist drainage 24   Base red;moist 24 09   Red (%), Wound Tissue Color 100 24   Periwound pink 24   Wound Length (cm) 2.4 cm 24   Wound Width (cm) 2 cm 24   Wound Depth (cm) 0.4 cm 24   Wound Surface Area (cm^2) 4.8 cm^2 24 09   Wound Volume (cm^3) 1.92 cm^3 24   Drainage Characteristics/Odor serosanguineous 24   Drainage Amount scant 24   Care, Wound cleansed with;sterile normal saline 24   Dressing Care dressing applied;dressing changed;silver impregnated;silicone 24        Wound Check / Follow-up:  wound nurse consult for left arm wound. Patient alert and agreeable to wound care. Red moist wound base noted. Very clean in appearance, no purulence, no odor, non tender with wound care  at present.     Educated on suggested wound care for home use in case of DC today. Patient verbalized teachback on care at home.     Impression: improving wound to left arm    Short term goals:  regain skin integrity.     Angela Klein RN    2024    13:14 EDT

## 2024-09-30 NOTE — NURSING NOTE
[facility] contacted, will provide transportation for this patient. Waiting on ride at this time

## 2024-09-30 NOTE — OUTREACH NOTE
Prep Survey      Flowsheet Row Responses   [facility] facility patient discharged from? [facility]   Is LACE score < 7 ? Yes   Eligibility Not Eligible   What are the reasons patient is not eligible? Other  [low risk for readmit]   Does the patient have one of the following disease processes/diagnoses(primary or secondary)? Other   Prep survey completed? Yes            Elana ASENCIO - Registered Nurse

## 2024-10-02 LAB
BACTERIA SPEC AEROBE CULT: NORMAL
BACTERIA SPEC AEROBE CULT: NORMAL